# Patient Record
Sex: FEMALE | Race: WHITE | NOT HISPANIC OR LATINO | Employment: STUDENT | URBAN - METROPOLITAN AREA
[De-identification: names, ages, dates, MRNs, and addresses within clinical notes are randomized per-mention and may not be internally consistent; named-entity substitution may affect disease eponyms.]

---

## 2023-03-09 LAB
ALANINE AMINOTRANSFERASE (SGPT) (U/L) IN SER/PLAS: 9 U/L (ref 7–45)
ALBUMIN (G/DL) IN SER/PLAS: 4.9 G/DL (ref 3.4–5)
ALKALINE PHOSPHATASE (U/L) IN SER/PLAS: 43 U/L (ref 33–110)
ANION GAP IN SER/PLAS: 14 MMOL/L (ref 10–20)
ASPARTATE AMINOTRANSFERASE (SGOT) (U/L) IN SER/PLAS: 15 U/L (ref 9–39)
BASOPHILS (10*3/UL) IN BLOOD BY AUTOMATED COUNT: 0.05 X10E9/L (ref 0–0.1)
BASOPHILS/100 LEUKOCYTES IN BLOOD BY AUTOMATED COUNT: 0.7 % (ref 0–2)
BILIRUBIN TOTAL (MG/DL) IN SER/PLAS: 0.4 MG/DL (ref 0–1.2)
CALCIUM (MG/DL) IN SER/PLAS: 9.9 MG/DL (ref 8.6–10.6)
CARBON DIOXIDE, TOTAL (MMOL/L) IN SER/PLAS: 29 MMOL/L (ref 21–32)
CHLORIDE (MMOL/L) IN SER/PLAS: 104 MMOL/L (ref 98–107)
CREATININE (MG/DL) IN SER/PLAS: 0.7 MG/DL (ref 0.5–1.05)
EOSINOPHILS (10*3/UL) IN BLOOD BY AUTOMATED COUNT: 0.12 X10E9/L (ref 0–0.7)
EOSINOPHILS/100 LEUKOCYTES IN BLOOD BY AUTOMATED COUNT: 1.7 % (ref 0–6)
ERYTHROCYTE DISTRIBUTION WIDTH (RATIO) BY AUTOMATED COUNT: 15.8 % (ref 11.5–14.5)
ERYTHROCYTE MEAN CORPUSCULAR HEMOGLOBIN CONCENTRATION (G/DL) BY AUTOMATED: 31.2 G/DL (ref 32–36)
ERYTHROCYTE MEAN CORPUSCULAR VOLUME (FL) BY AUTOMATED COUNT: 80 FL (ref 80–100)
ERYTHROCYTES (10*6/UL) IN BLOOD BY AUTOMATED COUNT: 5.11 X10E12/L (ref 4–5.2)
GFR FEMALE: >90 ML/MIN/1.73M2
GLUCOSE (MG/DL) IN SER/PLAS: 86 MG/DL (ref 74–99)
HEMATOCRIT (%) IN BLOOD BY AUTOMATED COUNT: 40.7 % (ref 36–46)
HEMOGLOBIN (G/DL) IN BLOOD: 12.7 G/DL (ref 12–16)
IMMATURE GRANULOCYTES/100 LEUKOCYTES IN BLOOD BY AUTOMATED COUNT: 0.1 % (ref 0–0.9)
LEUKOCYTES (10*3/UL) IN BLOOD BY AUTOMATED COUNT: 7.1 X10E9/L (ref 4.4–11.3)
LYMPHOCYTES (10*3/UL) IN BLOOD BY AUTOMATED COUNT: 3.27 X10E9/L (ref 1.2–4.8)
LYMPHOCYTES/100 LEUKOCYTES IN BLOOD BY AUTOMATED COUNT: 46.1 % (ref 13–44)
MONOCYTES (10*3/UL) IN BLOOD BY AUTOMATED COUNT: 0.46 X10E9/L (ref 0.1–1)
MONOCYTES/100 LEUKOCYTES IN BLOOD BY AUTOMATED COUNT: 6.5 % (ref 2–10)
NEUTROPHILS (10*3/UL) IN BLOOD BY AUTOMATED COUNT: 3.18 X10E9/L (ref 1.2–7.7)
NEUTROPHILS/100 LEUKOCYTES IN BLOOD BY AUTOMATED COUNT: 44.9 % (ref 40–80)
NRBC (PER 100 WBCS) BY AUTOMATED COUNT: 0 /100 WBC (ref 0–0)
PLATELETS (10*3/UL) IN BLOOD AUTOMATED COUNT: 341 X10E9/L (ref 150–450)
POTASSIUM (MMOL/L) IN SER/PLAS: 4.6 MMOL/L (ref 3.5–5.3)
PROTEIN TOTAL: 7.4 G/DL (ref 6.4–8.2)
SODIUM (MMOL/L) IN SER/PLAS: 142 MMOL/L (ref 136–145)
THYROTROPIN (MIU/L) IN SER/PLAS BY DETECTION LIMIT <= 0.05 MIU/L: 5.34 MIU/L (ref 0.44–3.98)
THYROXINE (T4) FREE (NG/DL) IN SER/PLAS: 1.22 NG/DL (ref 0.78–1.48)
UREA NITROGEN (MG/DL) IN SER/PLAS: 10 MG/DL (ref 6–23)

## 2023-03-13 LAB — TSH RECEPTOR ANTIBODY: <0.8 IU/L

## 2023-03-15 LAB — THYROID STIMULATING IMMUNOGLOBULIN: <1 TSI INDEX

## 2023-06-03 LAB
THYROTROPIN (MIU/L) IN SER/PLAS BY DETECTION LIMIT <= 0.05 MIU/L: 1.68 MIU/L (ref 0.44–3.98)
THYROXINE (T4) FREE (NG/DL) IN SER/PLAS: 0.94 NG/DL (ref 0.61–1.12)
TRIIODOTHYRONINE (T3) (NG/DL) IN SER/PLAS: 124 NG/DL (ref 80–210)

## 2023-08-22 LAB
THYROTROPIN (MIU/L) IN SER/PLAS BY DETECTION LIMIT <= 0.05 MIU/L: 0.03 MIU/L (ref 0.44–3.98)
THYROXINE (T4) FREE (NG/DL) IN SER/PLAS: 1.64 NG/DL (ref 0.61–1.12)
TRIIODOTHYRONINE (T3) (NG/DL) IN SER/PLAS: 208 NG/DL (ref 80–210)

## 2023-08-26 LAB — TSH RECEPTOR ANTIBODY: <0.8 IU/L

## 2023-10-23 ENCOUNTER — LAB (OUTPATIENT)
Dept: LAB | Facility: LAB | Age: 18
End: 2023-10-23
Payer: COMMERCIAL

## 2023-10-23 DIAGNOSIS — E05.90 HYPERTHYROIDISM: ICD-10-CM

## 2023-10-23 LAB
T4 FREE SERPL-MCNC: 0.44 NG/DL (ref 0.61–1.12)
TSH SERPL-ACNC: 31.96 MIU/L (ref 0.44–3.98)

## 2023-10-23 PROCEDURE — 84439 ASSAY OF FREE THYROXINE: CPT

## 2023-10-23 PROCEDURE — 36415 COLL VENOUS BLD VENIPUNCTURE: CPT

## 2023-10-23 PROCEDURE — 84480 ASSAY TRIIODOTHYRONINE (T3): CPT

## 2023-10-23 PROCEDURE — 84443 ASSAY THYROID STIM HORMONE: CPT

## 2023-10-24 LAB — T3 SERPL-MCNC: 99 NG/DL (ref 80–210)

## 2023-10-24 RX ORDER — METHIMAZOLE 5 MG/1
5 TABLET ORAL DAILY
Qty: 90 TABLET | Refills: 3 | Status: CANCELLED | OUTPATIENT
Start: 2023-10-24 | End: 2024-10-23

## 2023-10-26 DIAGNOSIS — E05.00 GRAVES DISEASE: Primary | ICD-10-CM

## 2023-10-26 PROBLEM — E05.90 HYPERTHYROIDISM: Status: ACTIVE | Noted: 2023-10-26

## 2023-11-25 ENCOUNTER — LAB (OUTPATIENT)
Dept: LAB | Facility: LAB | Age: 18
End: 2023-11-25
Payer: COMMERCIAL

## 2023-11-25 DIAGNOSIS — E05.00 GRAVES DISEASE: ICD-10-CM

## 2023-11-25 LAB
T3 SERPL-MCNC: 124 NG/DL (ref 80–210)
T4 FREE SERPL-MCNC: 0.61 NG/DL (ref 0.61–1.12)
TSH SERPL-ACNC: 9.67 MIU/L (ref 0.44–3.98)

## 2023-11-25 PROCEDURE — 84443 ASSAY THYROID STIM HORMONE: CPT

## 2023-11-25 PROCEDURE — 84439 ASSAY OF FREE THYROXINE: CPT

## 2023-11-25 PROCEDURE — 36415 COLL VENOUS BLD VENIPUNCTURE: CPT

## 2023-11-25 PROCEDURE — 84480 ASSAY TRIIODOTHYRONINE (T3): CPT

## 2023-11-28 ENCOUNTER — PATIENT MESSAGE (OUTPATIENT)
Dept: PEDIATRIC ENDOCRINOLOGY | Facility: HOSPITAL | Age: 18
End: 2023-11-28
Payer: COMMERCIAL

## 2023-11-29 NOTE — RESULT ENCOUNTER NOTE
Decrease methimazole to 2.5 mg daily. Repeat TFTs in 4 weeks--orders in chart. Messaged recs to Radha.

## 2023-12-27 DIAGNOSIS — E05.00 GRAVES DISEASE: ICD-10-CM

## 2023-12-28 ENCOUNTER — TELEPHONE (OUTPATIENT)
Dept: OTOLARYNGOLOGY | Facility: HOSPITAL | Age: 18
End: 2023-12-28
Payer: COMMERCIAL

## 2023-12-28 DIAGNOSIS — B99.9 INFECTION: Primary | ICD-10-CM

## 2023-12-28 DIAGNOSIS — R60.9 SWELLING: ICD-10-CM

## 2023-12-28 RX ORDER — DOXYCYCLINE 100 MG/1
100 CAPSULE ORAL 2 TIMES DAILY
Qty: 20 CAPSULE | Refills: 0 | Status: SHIPPED | OUTPATIENT
Start: 2023-12-28 | End: 2023-12-31 | Stop reason: ALTCHOICE

## 2023-12-31 RX ORDER — AZITHROMYCIN 500 MG/1
500 TABLET, FILM COATED ORAL DAILY
Qty: 5 TABLET | Refills: 0 | Status: SHIPPED | OUTPATIENT
Start: 2023-12-31 | End: 2024-01-05

## 2023-12-31 RX ORDER — METHYLPREDNISOLONE 4 MG/1
TABLET ORAL
Qty: 21 TABLET | Refills: 0 | Status: SHIPPED | OUTPATIENT
Start: 2023-12-31

## 2024-02-28 ENCOUNTER — LAB (OUTPATIENT)
Dept: LAB | Facility: LAB | Age: 19
End: 2024-02-28
Payer: COMMERCIAL

## 2024-02-28 DIAGNOSIS — E05.00 GRAVES DISEASE: ICD-10-CM

## 2024-02-28 DIAGNOSIS — R74.01 ELEVATED ALT MEASUREMENT: ICD-10-CM

## 2024-02-28 LAB
BASOPHILS # BLD AUTO: 0.05 X10*3/UL (ref 0–0.1)
BASOPHILS NFR BLD AUTO: 0.9 %
EOSINOPHIL # BLD AUTO: 0.05 X10*3/UL (ref 0–0.7)
EOSINOPHIL NFR BLD AUTO: 0.9 %
ERYTHROCYTE [DISTWIDTH] IN BLOOD BY AUTOMATED COUNT: 15.9 % (ref 11.5–14.5)
HCT VFR BLD AUTO: 44.5 % (ref 36–46)
HGB BLD-MCNC: 13.7 G/DL (ref 12–16)
IMM GRANULOCYTES # BLD AUTO: 0.01 X10*3/UL (ref 0–0.7)
IMM GRANULOCYTES NFR BLD AUTO: 0.2 % (ref 0–0.9)
LYMPHOCYTES # BLD AUTO: 2.47 X10*3/UL (ref 1.2–4.8)
LYMPHOCYTES NFR BLD AUTO: 42 %
MCH RBC QN AUTO: 24.9 PG (ref 26–34)
MCHC RBC AUTO-ENTMCNC: 30.8 G/DL (ref 32–36)
MCV RBC AUTO: 81 FL (ref 80–100)
MONOCYTES # BLD AUTO: 0.43 X10*3/UL (ref 0.1–1)
MONOCYTES NFR BLD AUTO: 7.3 %
NEUTROPHILS # BLD AUTO: 2.87 X10*3/UL (ref 1.2–7.7)
NEUTROPHILS NFR BLD AUTO: 48.7 %
NRBC BLD-RTO: 0 /100 WBCS (ref 0–0)
PLATELET # BLD AUTO: 300 X10*3/UL (ref 150–450)
RBC # BLD AUTO: 5.5 X10*6/UL (ref 4–5.2)
T4 FREE SERPL-MCNC: 0.68 NG/DL (ref 0.61–1.12)
TSH SERPL-ACNC: 3.31 MIU/L (ref 0.44–3.98)
WBC # BLD AUTO: 5.9 X10*3/UL (ref 4.4–11.3)

## 2024-02-28 PROCEDURE — 84439 ASSAY OF FREE THYROXINE: CPT

## 2024-02-28 PROCEDURE — 85025 COMPLETE CBC W/AUTO DIFF WBC: CPT

## 2024-02-28 PROCEDURE — 84443 ASSAY THYROID STIM HORMONE: CPT

## 2024-02-28 PROCEDURE — 84480 ASSAY TRIIODOTHYRONINE (T3): CPT

## 2024-02-28 PROCEDURE — 80076 HEPATIC FUNCTION PANEL: CPT

## 2024-02-28 PROCEDURE — 36415 COLL VENOUS BLD VENIPUNCTURE: CPT

## 2024-02-29 ENCOUNTER — OFFICE VISIT (OUTPATIENT)
Dept: PEDIATRIC ENDOCRINOLOGY | Facility: CLINIC | Age: 19
End: 2024-02-29
Payer: COMMERCIAL

## 2024-02-29 VITALS
HEART RATE: 76 BPM | DIASTOLIC BLOOD PRESSURE: 76 MMHG | BODY MASS INDEX: 24.66 KG/M2 | WEIGHT: 134 LBS | HEIGHT: 62 IN | SYSTOLIC BLOOD PRESSURE: 115 MMHG

## 2024-02-29 DIAGNOSIS — E05.00 GRAVES DISEASE: ICD-10-CM

## 2024-02-29 DIAGNOSIS — R74.01 ELEVATED ALT MEASUREMENT: Primary | ICD-10-CM

## 2024-02-29 LAB
ALBUMIN SERPL BCP-MCNC: 4.6 G/DL (ref 3.4–5)
ALP SERPL-CCNC: 37 U/L (ref 33–110)
ALT SERPL W P-5'-P-CCNC: 13 U/L (ref 7–45)
AST SERPL W P-5'-P-CCNC: 17 U/L (ref 9–39)
BILIRUB DIRECT SERPL-MCNC: 0.1 MG/DL (ref 0–0.3)
BILIRUB SERPL-MCNC: 0.4 MG/DL (ref 0–1.2)
PROT SERPL-MCNC: 7.4 G/DL (ref 6.4–8.2)
T3 SERPL-MCNC: 103 NG/DL (ref 80–210)

## 2024-02-29 PROCEDURE — 99214 OFFICE O/P EST MOD 30 MIN: CPT | Performed by: PEDIATRICS

## 2024-02-29 ASSESSMENT — ENCOUNTER SYMPTOMS
PSYCHIATRIC NEGATIVE: 1
NEUROLOGICAL NEGATIVE: 1
EYES NEGATIVE: 1
UNEXPECTED WEIGHT CHANGE: 0
PALPITATIONS: 0
FATIGUE: 0
MUSCULOSKELETAL NEGATIVE: 1
DIARRHEA: 0
SHORTNESS OF BREATH: 0

## 2024-02-29 NOTE — PATIENT INSTRUCTIONS
Great seeing you today  Continue MMI 2.5 mg daily   Will consider definitive options such as radioactive ablation for the summer.

## 2024-02-29 NOTE — PROGRESS NOTES
"Subjective   Radha Alvarado is a 18 y.o. female who presents for follow-up for Graves' disease.  Last seen on 8/2023.    Endocrine history  Diagnosed with Graves' disease on 1/2021.  Has been on methimazole since then.  Had negative TSI and TRAb in March 2023 and was weaned off methimazole in May 2023.   Repeat TFT showed recurrence and was restarted on MMI 10 mgin 8/2023. Doses have been weaned, currently on 2.5 mg daily     Interval History  Currently on 2.5 mg daily.  Denies any missed doses.  Denies any symptoms of hyperthyroidism such as tremors, palpitations, sweating, heat intolerance, diarrhea or other systemic symptoms.  Would like to pursue definitive treatment, would like to do radioactive iodine ablation.    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    Had labs done on 2/28 with free T4 of 0.68, total T3 of 103 and TSH of 3.31.  Most recent CMP on 1/24 had ALT of 107 however was diagnosed with EBV at that time    Review of Systems   Constitutional:  Negative for fatigue and unexpected weight change.   HENT: Negative.     Eyes: Negative.    Respiratory:  Negative for shortness of breath.    Cardiovascular:  Negative for palpitations.   Gastrointestinal:  Negative for diarrhea.   Endocrine: Negative for heat intolerance.   Genitourinary: Negative.    Musculoskeletal: Negative.    Neurological: Negative.    Psychiatric/Behavioral: Negative.           Objective   /76   Pulse 76   Ht 1.577 m (5' 2.09\")   Wt 60.8 kg (134 lb)   BMI 24.44 kg/m²   Growth Velocity: Facility age limit for growth %braulio is 20 years.    Physical Exam  Constitutional:       Appearance: Normal appearance.   HENT:      Mouth/Throat:      Mouth: Mucous membranes are moist.   Eyes:      Pupils: Pupils are equal, round, and reactive to light.   Neck:      Comments: Soft boggy goiter, 4.5 cm   No nodules  Cardiovascular:      Rate and Rhythm: Normal rate and " regular rhythm.   Pulmonary:      Effort: Pulmonary effort is normal.      Breath sounds: Normal breath sounds.   Abdominal:      General: Abdomen is flat.   Musculoskeletal:         General: Normal range of motion.   Skin:     General: Skin is warm.      Capillary Refill: Capillary refill takes less than 2 seconds.   Neurological:      General: No focal deficit present.      Mental Status: She is alert.      Comments: No tremors         Assessment/Plan   Diagnoses and all orders for this visit:    Graves disease  This is a 18-year-old female with history of Graves' disease who is currently on low-dose methimazole.  She has failed remission in the past and therefore would require definitive treatment.  Radha prefers to have radioactive iodine, and is willing to do it over the summertime this year.  Information regarding radioactive iodine treatment and anticipatory guidance has been given today.  Will plan to get repeat ultrasound as it is been more than a year since her previous ultrasound in anticipation of her procedure.   Labs in May prior to her procedure include  -     Thyroid Stimulating Hormone; Future  -     TSH with reflex to Free T4 if abnormal; Future  -     Triiodothyronine, Total; Future  -     Thyroid Stimulating Immunoglobulin; Future  -     US thyroid; Future  -     Thyrotropin Receptor Antibody; Future    Once she undergoes radioactive iodine ablation, she will require TFTs in 4-6 weeks in order to start levothyroxine replacement.    Elevated ALT measurement  Will repeat LFTs, added onto labs done yesterday.  Likely secondary to her EBV infection however will repeat it in order to ensure this is not secondary to methimazole toxicity.  If ALT continues to be elevated, will need to consider different treatment sooner    Sherry Denny  PGY 6   Peds endo fellow

## 2024-03-22 DIAGNOSIS — E05.00 GRAVES DISEASE: ICD-10-CM

## 2024-05-01 ENCOUNTER — HOSPITAL ENCOUNTER (OUTPATIENT)
Dept: RADIOLOGY | Facility: HOSPITAL | Age: 19
Discharge: HOME | End: 2024-05-01
Payer: COMMERCIAL

## 2024-05-01 DIAGNOSIS — E05.00 GRAVES DISEASE: ICD-10-CM

## 2024-05-01 PROCEDURE — 76536 US EXAM OF HEAD AND NECK: CPT

## 2024-05-01 PROCEDURE — 76536 US EXAM OF HEAD AND NECK: CPT | Performed by: RADIOLOGY

## 2024-05-31 DIAGNOSIS — E05.00 GRAVES DISEASE: ICD-10-CM

## 2024-06-20 ENCOUNTER — LAB (OUTPATIENT)
Dept: LAB | Facility: LAB | Age: 19
End: 2024-06-20
Payer: COMMERCIAL

## 2024-06-20 DIAGNOSIS — E05.00 GRAVES DISEASE: ICD-10-CM

## 2024-06-20 LAB
B-HCG SERPL-ACNC: <2 MIU/ML
T3 SERPL-MCNC: 93 NG/DL (ref 80–210)
TSH SERPL-ACNC: 2.08 MIU/L (ref 0.44–3.98)

## 2024-06-20 PROCEDURE — 84702 CHORIONIC GONADOTROPIN TEST: CPT

## 2024-06-20 PROCEDURE — 84443 ASSAY THYROID STIM HORMONE: CPT

## 2024-06-20 PROCEDURE — 84445 ASSAY OF TSI GLOBULIN: CPT

## 2024-06-20 PROCEDURE — 83519 RIA NONANTIBODY: CPT

## 2024-06-20 PROCEDURE — 36415 COLL VENOUS BLD VENIPUNCTURE: CPT

## 2024-06-20 PROCEDURE — 84480 ASSAY TRIIODOTHYRONINE (T3): CPT

## 2024-06-22 LAB — TSH RECEP AB SER-ACNC: <1.1 IU/L

## 2024-06-24 ENCOUNTER — HOSPITAL ENCOUNTER (OUTPATIENT)
Dept: RADIOLOGY | Facility: HOSPITAL | Age: 19
Discharge: HOME | End: 2024-06-24
Payer: COMMERCIAL

## 2024-06-24 DIAGNOSIS — E05.00 GRAVES DISEASE: ICD-10-CM

## 2024-06-24 PROCEDURE — 3430000001 HC RX 343 DIAGNOSTIC RADIOPHARMACEUTICALS: Performed by: PEDIATRICS

## 2024-06-24 PROCEDURE — A9516 IODINE I-123 SOD IODIDE MIC: HCPCS | Performed by: PEDIATRICS

## 2024-06-24 PROCEDURE — 78014 THYROID IMAGING W/BLOOD FLOW: CPT

## 2024-06-24 RX ORDER — SODIUM IODIDE I 123 200 UCI/1
458 CAPSULE, GELATIN COATED ORAL
Status: COMPLETED | OUTPATIENT
Start: 2024-06-24 | End: 2024-06-24

## 2024-06-25 ENCOUNTER — HOSPITAL ENCOUNTER (OUTPATIENT)
Dept: RADIOLOGY | Facility: HOSPITAL | Age: 19
Discharge: HOME | End: 2024-06-25
Payer: COMMERCIAL

## 2024-06-25 ENCOUNTER — APPOINTMENT (OUTPATIENT)
Dept: RADIOLOGY | Facility: HOSPITAL | Age: 19
End: 2024-06-25
Payer: COMMERCIAL

## 2024-06-25 DIAGNOSIS — E05.00 GRAVES DISEASE: ICD-10-CM

## 2024-06-25 PROCEDURE — 79005 NUCLEAR RX ORAL ADMIN: CPT

## 2024-06-25 PROCEDURE — 78014 THYROID IMAGING W/BLOOD FLOW: CPT | Performed by: RADIOLOGY

## 2024-06-25 PROCEDURE — 3430000001 HC RX 343 DIAGNOSTIC RADIOPHARMACEUTICALS: Performed by: PEDIATRICS

## 2024-06-25 PROCEDURE — A9517 I131 IODIDE CAP, RX: HCPCS | Performed by: PEDIATRICS

## 2024-06-25 PROCEDURE — 79005 NUCLEAR RX ORAL ADMIN: CPT | Performed by: NUCLEAR MEDICINE

## 2024-06-25 RX ORDER — SODIUM IODIDE I 131 100 MCI/1
12.5 CAPSULE ORAL
Status: DISCONTINUED | OUTPATIENT
Start: 2024-06-25 | End: 2024-06-25

## 2024-06-25 RX ORDER — SODIUM IODIDE I 131 100 MCI/1
12.5 CAPSULE ORAL
Status: COMPLETED | OUTPATIENT
Start: 2024-06-25 | End: 2024-06-25

## 2024-06-27 ENCOUNTER — APPOINTMENT (OUTPATIENT)
Dept: RADIOLOGY | Facility: HOSPITAL | Age: 19
End: 2024-06-27
Payer: COMMERCIAL

## 2024-06-27 LAB — TSI SER-ACNC: <1 TSI INDEX

## 2024-07-18 ENCOUNTER — APPOINTMENT (OUTPATIENT)
Dept: PEDIATRIC ENDOCRINOLOGY | Facility: CLINIC | Age: 19
End: 2024-07-18
Payer: COMMERCIAL

## 2024-07-18 VITALS
HEART RATE: 93 BPM | WEIGHT: 133.3 LBS | DIASTOLIC BLOOD PRESSURE: 74 MMHG | BODY MASS INDEX: 24.53 KG/M2 | RESPIRATION RATE: 18 BRPM | SYSTOLIC BLOOD PRESSURE: 122 MMHG | HEIGHT: 62 IN

## 2024-07-18 DIAGNOSIS — E89.0 POSTABLATIVE HYPOTHYROIDISM: Primary | ICD-10-CM

## 2024-07-18 DIAGNOSIS — E05.00 GRAVES DISEASE: ICD-10-CM

## 2024-07-18 DIAGNOSIS — E05.90 HYPERTHYROIDISM: ICD-10-CM

## 2024-07-18 PROCEDURE — 3008F BODY MASS INDEX DOCD: CPT | Performed by: PEDIATRICS

## 2024-07-18 PROCEDURE — 99215 OFFICE O/P EST HI 40 MIN: CPT | Performed by: PEDIATRICS

## 2024-07-18 NOTE — PATIENT INSTRUCTIONS
Check labs in the next week.     Will let you know results and plan for treatment.     When you start levothyroxine, take daily on an empty stomach, 15-20 min before eating.   If you forget, take when you remember (it's ok if you've eaten).   Check labs 6 weeks after starting it.     We can also check labs if you have symptoms of hypothyroid (low thyroid): fatigue, dry skin, excess hair loss, constipation, feeling cold, weight gain, swelling, irregular periods  Or Hyperthyroid: excess energy, sweating, flushing, diarrhea, weight loss, feeling hot, inattention.     Come back Dec 19, 2024    Kary Cuba MD  [phone]

## 2024-07-18 NOTE — PROGRESS NOTES
"Subjective   Radha Alvarado is a 18 y.o. female who presents for follow-up for Graves' disease.  Last seen on 8/2023.    Endocrine history  Diagnosed with Graves' disease and started methimazole in 1/2021.  Weaned down to low dose over 2 years. Had negative TSI and TRAb in March 2023 and was weaned off methimazole in May 2023.   Repeat TFT showed recurrence and was restarted on MMI 10 mg in 8/2023. Dose again weaned to 2.5 mg daily.   At last visit in Feb 2024, decision made to procede with definitive therapy with radioiodide ablation.    Interval History  GODINEZ ablation performed  with I-131 on 6/25/24. No complications.   Pre-ablation TFTs were normal with negative Abs.   US and uptake scan showed mild thyroid enlargement, no nodules. 72% GODINEZ uptake (I-123) (normal 10-30%).    Denies any symptoms of hyperthyroidism such as tremors, palpitations, sweating, heat intolerance, diarrhea or other systemic symptoms.  Denies symptoms of hypothyroidism including fatigue, dry skin, excess hair loss, constipation, cold intolerance, weight gain, swelling.     Followed by Ophthalmology--no Grave's ophthalmopathy.    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  Trip to [place] with [place].    Objective   /74 (BP Location: Right arm, Patient Position: Sitting)   Pulse 93   Resp 18   Ht 1.573 m (5' 1.93\")   Wt 60.5 kg (133 lb 4.8 oz)   BMI 24.44 kg/m²   Growth Velocity: Facility age limit for growth %braulio is 20 years.    Physical Exam  Constitutional:       Appearance: Normal appearance.   HENT:      Mouth/Throat:      Mouth: Mucous membranes are moist.   Eyes:      Pupils: Pupils are equal, round, and reactive to light.   Neck:      Comments: Goiter seems smaller, soft ~4 cm bilat  No nodules  Cardiovascular:      Rate and Rhythm: Normal rate and regular rhythm.   Pulmonary:      Effort: Pulmonary effort is normal.      Breath sounds: Normal breath sounds.   Abdominal:      General: " Abdomen is flat.   Musculoskeletal:         General: Normal range of motion.   Skin:     General: Skin is warm.      Capillary Refill: Capillary refill takes less than 2 seconds.   Neurological:      General: No focal deficit present.      Mental Status: She is alert.      Comments: No tremors     LABS:  Lab on 06/20/2024   Component Date Value Ref Range Status    Thyroid Stimulating Hormone 06/20/2024 2.08  0.44 - 3.98 mIU/L Final    Triiodothyronine 06/20/2024 93  80 - 210 ng/dL Final    TSI 06/20/2024 <1.0  <=1.3 TSI index Final    TSH Receptor AB 06/20/2024 <1.10  <=1.75 IU/L Final    HCG, Beta-Quantitative 06/20/2024 <2  <5 mIU/mL Final     IMAGING  NM I-131 thyroid therapy  Narrative: Interpreted By:  Faustino Roche and Jones Robert   STUDY:  NM I-131 THYROID THERAPY;  6/25/2024 2:25 pm      INDICATION:  Signs/Symptoms:Graves, hyperthyroid.      COMPARISON:  Nuclear medicine thyroid uptake and scan from 04/25/2024.      ACCESSION NUMBER(S):  [id number]      ORDERING CLINICIAN:  JORGE ALBERTO ELIZABETH      TECHNIQUE:  DIVISION OF NUCLEAR MEDICINE  RADIOIODINE THERAPY, HYPERTHYROIDISM      Following discussion of the benefits and risks of radioiodine  therapy, including therapeutic alternatives and the risk of  hypothyroidism, the patient consented to radioiodine therapy and then  received an oral therapeutic dose of  12.5 milliCuries of Iodine-131  sodium iodide without any immediate complications.  Further followup  will be provided by the patient's referring physician, doctor Elizabeth.      FINDINGS:  Radioiodine therapy for Graves disease. No images were obtained. A  pregnancy test was negative.      Impression: Graves disease undergoing I-131 radioiodine therapy.      MACRO:  None          Signed by: Faustino Roche 6/25/2024 3:49 PM  Dictation workstation:   [id number]  NM thyroid uptake and scan  Narrative: Interpreted By:  Blayne Moser,   STUDY:  NM THYROID UPTAKE AND SCAN;  6/25/2024 2:20 pm       INDICATION:  Signs/Symptoms:Graves, hyperthyroid.      COMPARISON:  None.      ACCESSION NUMBER(S):  [id number]      ORDERING CLINICIAN:  JORGE ALBERTO ELIZABETH      TECHNIQUE:  DIVISION OF NUCLEAR MEDICINE  RADIOIODINE UPTAKE AND THYROID SCAN CONSULTATION, LIMITED      The patient consented to and received an oral diagnostic dose of  443  microcuries of Iodine-123 sodium iodide on  06/24/2024. Radioiodine  uptake measurements and thyroid images were then obtained  approximately 24 hours later on  .      FINDINGS:  The uptake of radioiodine in the anterior neck at about 24 hours is  72 percent (normal 10 to 30 percent).      Thyroid images demonstrate mild heterogeneity without definite  nodularity in the thyroid gland appears to be mildly enlarged..      Impression: Mild heterogeneity without definite nodularity in the thyroid gland  appears to be mildly enlarged and an elevated uptake of 72%. In the  proper clinical context, findings can be seen with Graves disease.      MACRO:  None      Signed by: Blayne Moser 6/25/2024 2:35 PM  Dictation workstation:   [id number]         Assessment/Plan   Graves disease  This is a 18-year-old female with history of Graves' disease now s/p GODINEZ ablation 4 weeks ago. She is doing well and is asymptomatic. I antiocipate she will start to have some biochemical hypothyroidism at some point in the next 2-4 weeks.    We discussed treatment with  Levothyroxine (timing, how to administer, monitoring). Reviewed symptoms of hypo and hyperthyroidism.     PLAN:   Check TFTs today. Also check celiac screen given h/o autoimmune disease.   Will plan treatment based on labs  Follow-up over winter break    Problem List Items Addressed This Visit             ICD-10-CM    Graves disease E05.00    Hyperthyroidism E05.90    Postablative hypothyroidism - Primary E89.0    Relevant Orders    Tissue Transglutaminase IgA (Completed)    Thyroid Stimulating Hormone (Completed)    Thyroxine, Free (Completed)

## 2024-07-19 ENCOUNTER — LAB (OUTPATIENT)
Dept: LAB | Facility: LAB | Age: 19
End: 2024-07-19
Payer: COMMERCIAL

## 2024-07-19 DIAGNOSIS — E89.0 POSTABLATIVE HYPOTHYROIDISM: ICD-10-CM

## 2024-07-19 LAB
T4 FREE SERPL-MCNC: 0.65 NG/DL (ref 0.61–1.12)
TSH SERPL-ACNC: 9.26 MIU/L (ref 0.44–3.98)
TTG IGA SER IA-ACNC: <1 U/ML

## 2024-07-19 PROCEDURE — 83516 IMMUNOASSAY NONANTIBODY: CPT

## 2024-07-19 PROCEDURE — 84443 ASSAY THYROID STIM HORMONE: CPT

## 2024-07-19 PROCEDURE — 84439 ASSAY OF FREE THYROXINE: CPT

## 2024-07-19 PROCEDURE — 36415 COLL VENOUS BLD VENIPUNCTURE: CPT

## 2024-07-24 DIAGNOSIS — E89.0 POSTABLATIVE HYPOTHYROIDISM: Primary | ICD-10-CM

## 2024-07-24 RX ORDER — LEVOTHYROXINE SODIUM 75 UG/1
75 TABLET ORAL DAILY
Qty: 30 TABLET | Refills: 11 | Status: SHIPPED | OUTPATIENT
Start: 2024-07-24 | End: 2025-07-24

## 2024-08-01 PROBLEM — E89.0 POSTABLATIVE HYPOTHYROIDISM: Status: ACTIVE | Noted: 2024-08-01

## 2024-08-20 ENCOUNTER — LAB (OUTPATIENT)
Dept: LAB | Facility: LAB | Age: 19
End: 2024-08-20
Payer: COMMERCIAL

## 2024-08-20 DIAGNOSIS — E89.0 POSTABLATIVE HYPOTHYROIDISM: ICD-10-CM

## 2024-08-20 LAB
T4 FREE SERPL-MCNC: 1.1 NG/DL (ref 0.61–1.12)
TSH SERPL-ACNC: 0.09 MIU/L (ref 0.44–3.98)

## 2024-08-20 PROCEDURE — 84443 ASSAY THYROID STIM HORMONE: CPT

## 2024-08-20 PROCEDURE — 36415 COLL VENOUS BLD VENIPUNCTURE: CPT

## 2024-08-20 PROCEDURE — 84439 ASSAY OF FREE THYROXINE: CPT

## 2024-08-21 DIAGNOSIS — E89.0 POSTABLATIVE HYPOTHYROIDISM: ICD-10-CM

## 2024-08-21 RX ORDER — LEVOTHYROXINE SODIUM 50 UG/1
50 TABLET ORAL DAILY
Qty: 30 TABLET | Refills: 11 | Status: SHIPPED | OUTPATIENT
Start: 2024-08-21 | End: 2025-08-21

## 2024-08-21 NOTE — RESULT ENCOUNTER NOTE
Suppressed TSH on  Levothyroxine 75 mcg. Will decrease Levothyroxine to 50 mcg daily and recheck TFTs in 4-8 weeks.

## 2024-10-05 ENCOUNTER — LAB (OUTPATIENT)
Dept: LAB | Facility: LAB | Age: 19
End: 2024-10-05
Payer: COMMERCIAL

## 2024-10-05 DIAGNOSIS — E89.0 POSTABLATIVE HYPOTHYROIDISM: ICD-10-CM

## 2024-10-05 LAB
T4 FREE SERPL-MCNC: 0.7 NG/DL (ref 0.61–1.12)
TSH SERPL-ACNC: 1.06 MIU/L (ref 0.44–3.98)

## 2024-10-05 PROCEDURE — 84439 ASSAY OF FREE THYROXINE: CPT

## 2024-10-05 PROCEDURE — 36415 COLL VENOUS BLD VENIPUNCTURE: CPT

## 2024-10-05 PROCEDURE — 84443 ASSAY THYROID STIM HORMONE: CPT

## 2025-01-06 DIAGNOSIS — E89.0 POSTABLATIVE HYPOTHYROIDISM: ICD-10-CM

## 2025-01-07 ENCOUNTER — LAB (OUTPATIENT)
Dept: LAB | Facility: LAB | Age: 20
End: 2025-01-07
Payer: COMMERCIAL

## 2025-01-07 DIAGNOSIS — E89.0 POSTABLATIVE HYPOTHYROIDISM: ICD-10-CM

## 2025-01-07 LAB
T4 FREE SERPL-MCNC: 0.83 NG/DL (ref 0.61–1.12)
TSH SERPL-ACNC: 42.16 MIU/L (ref 0.44–3.98)

## 2025-01-07 PROCEDURE — 84439 ASSAY OF FREE THYROXINE: CPT

## 2025-01-07 PROCEDURE — 84443 ASSAY THYROID STIM HORMONE: CPT

## 2025-01-09 ENCOUNTER — APPOINTMENT (OUTPATIENT)
Dept: PEDIATRIC ENDOCRINOLOGY | Facility: CLINIC | Age: 20
End: 2025-01-09
Payer: COMMERCIAL

## 2025-01-09 VITALS
WEIGHT: 137.35 LBS | HEIGHT: 62 IN | HEART RATE: 86 BPM | BODY MASS INDEX: 25.27 KG/M2 | DIASTOLIC BLOOD PRESSURE: 82 MMHG | SYSTOLIC BLOOD PRESSURE: 134 MMHG

## 2025-01-09 DIAGNOSIS — E89.0 POSTABLATIVE HYPOTHYROIDISM: Primary | ICD-10-CM

## 2025-01-09 PROCEDURE — 3008F BODY MASS INDEX DOCD: CPT | Performed by: PEDIATRICS

## 2025-01-09 PROCEDURE — 99214 OFFICE O/P EST MOD 30 MIN: CPT | Performed by: PEDIATRICS

## 2025-01-09 RX ORDER — LEVOTHYROXINE SODIUM 75 UG/1
75 TABLET ORAL DAILY
Qty: 30 TABLET | Refills: 11 | Status: SHIPPED | OUTPATIENT
Start: 2025-01-09 | End: 2026-01-09

## 2025-01-09 NOTE — PROGRESS NOTES
"Subjective   Radha Alvarado is a 19 y.o. female who presents for follow-up for Graves' disease.  Last seen on 8/2023.    Endocrine history  Diagnosed with Graves' disease and started methimazole in 1/2021.  Weaned down to low dose over 2 years. Had negative TSI and TRAb in March 2023 and was weaned off methimazole in May 2023.   Grave's recurred and restarted on MMI 10 mg in 8/2023. Dose again weaned to 2.5 mg daily.   GODINEZ ablation performed with I-131 on 6/25/24. No complications.   Pre-ablation TFTs were normal with negative Abs.   US and uptake scan showed mild thyroid enlargement, no nodules. 72% GODINEZ uptake (I-123) (normal 10-30%).  Levothyroxine started July 2024--initially 75 mcg-->decreased to 50 mcg in Aug 2024.  Normal TFTs in Oct 2024.    Interval History  Has been feeling tired. Some irregular menstrual spotting. Some dry skin, typical for winter.  Labs this week--TSH elevated to 42    Denies any symptoms of hyperthyroidism such as tremors, palpitations, sweating, heat intolerance, diarrhea or other systemic symptoms.  Denies other symptoms of hypothyroidism including dry skin, excess hair loss, constipation, cold intolerance, weight gain, swelling.     Followed by Ophthalmology--no Grave's ophthalmopathy.    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  Plans to study abroad this summer--maybe [place]    Objective   /82 (BP Location: Right arm, Patient Position: Sitting)   Pulse 86   Ht 1.571 m (5' 1.85\")   Wt 62.3 kg (137 lb 5.6 oz)   BMI 25.24 kg/m²   Growth Velocity: Facility age limit for growth %braulio is 20 years.    Physical Exam  Constitutional:       Appearance: Normal appearance.   HENT:      Mouth/Throat:      Mouth: Mucous membranes are moist.   Eyes:      Pupils: Pupils are equal, round, and reactive to light.   Neck:      Comments: Goiter seems smaller, soft ~4 cm bilat  No nodules  Cardiovascular:      Rate and Rhythm: Normal rate and regular rhythm.   Pulmonary: "      Effort: Pulmonary effort is normal.      Breath sounds: Normal breath sounds.   Abdominal:      General: Abdomen is flat.   Musculoskeletal:         General: Normal range of motion.   Skin:     General: Skin is warm.      Capillary Refill: Capillary refill takes less than 2 seconds.   Neurological:      General: No focal deficit present.      Mental Status: She is alert.      Comments: No tremors     LABS:  Lab on 01/07/2025   Component Date Value Ref Range Status    Thyroid Stimulating Hormone 01/07/2025 42.16 (H)  0.44 - 3.98 mIU/L Final    Thyroxine, Free 01/07/2025 0.83  0.61 - 1.12 ng/dL Final         Assessment/Plan   Graves disease  This is a 19 y.o.  female with history of Graves' disease now s/p GODINEZ ablation in June 2024. Started LT4 in July 2024. Now with elevated TSH indicating need for increased dose.   We reviewed treatment with  Levothyroxine (timing, how to administer, monitoring). Reviewed symptoms of hypo and hyperthyroidism.     PLAN:   --increase  Levothyroxine to 75 mcg daily  --recheck TFTs in 4-6 weeks  --follow up in summer 2025    Problem List Items Addressed This Visit             ICD-10-CM    Postablative hypothyroidism - Primary E89.0    Relevant Medications    levothyroxine (Synthroid, Levoxyl) 75 mcg tablet    Other Relevant Orders    Thyroid Stimulating Hormone    Thyroxine, Free

## 2025-01-09 NOTE — PATIENT INSTRUCTIONS
Increase your levothyroxine to 75 mcg daily    Recheck your labs in 4-6 weeks.     When you start levothyroxine, take daily on an empty stomach, 15-20 min before eating.   If you forget, take when you remember (it's ok if you've eaten).     We can also check labs if you have symptoms of hypothyroid (low thyroid): fatigue, dry skin, excess hair loss, constipation, feeling cold, weight gain, swelling, irregular periods  Or Hyperthyroid: excess energy, sweating, flushing, diarrhea, weight loss, feeling hot, inattention.     Come back summer 2025    Kary Cuba MD  [phone]

## 2025-02-20 DIAGNOSIS — E89.0 POSTABLATIVE HYPOTHYROIDISM: ICD-10-CM

## 2025-02-20 RX ORDER — LEVOTHYROXINE SODIUM 100 UG/1
100 TABLET ORAL DAILY
Qty: 30 TABLET | Refills: 11 | Status: SHIPPED | OUTPATIENT
Start: 2025-02-20 | End: 2026-02-20

## 2025-04-03 DIAGNOSIS — E89.0 POSTABLATIVE HYPOTHYROIDISM: ICD-10-CM

## 2025-05-07 ENCOUNTER — TELEPHONE (OUTPATIENT)
Dept: PEDIATRIC ENDOCRINOLOGY | Facility: HOSPITAL | Age: 20
End: 2025-05-07
Payer: COMMERCIAL

## 2025-05-07 DIAGNOSIS — E89.0 POSTABLATIVE HYPOTHYROIDISM: Primary | ICD-10-CM

## 2025-05-11 LAB
T4 FREE SERPL-MCNC: 1.3 NG/DL (ref 0.8–1.8)
TSH SERPL-ACNC: 6.06 MIU/L

## 2025-05-15 DIAGNOSIS — E89.0 POSTABLATIVE HYPOTHYROIDISM: ICD-10-CM

## 2025-05-20 RX ORDER — LEVOTHYROXINE SODIUM 112 UG/1
112 TABLET ORAL DAILY
Qty: 30 TABLET | Refills: 11 | Status: SHIPPED | OUTPATIENT
Start: 2025-05-20 | End: 2026-05-20

## 2025-07-31 ENCOUNTER — APPOINTMENT (OUTPATIENT)
Dept: PEDIATRIC ENDOCRINOLOGY | Facility: CLINIC | Age: 20
End: 2025-07-31
Payer: COMMERCIAL

## 2025-08-10 DIAGNOSIS — E89.0 POSTABLATIVE HYPOTHYROIDISM: ICD-10-CM

## 2025-08-12 LAB
T4 FREE SERPL-MCNC: 1.4 NG/DL (ref 0.8–1.8)
TSH SERPL-ACNC: 12.79 MIU/L

## 2025-08-14 ENCOUNTER — APPOINTMENT (OUTPATIENT)
Dept: PEDIATRIC ENDOCRINOLOGY | Facility: CLINIC | Age: 20
End: 2025-08-14
Payer: COMMERCIAL

## 2025-08-14 VITALS
WEIGHT: 143.74 LBS | HEART RATE: 85 BPM | SYSTOLIC BLOOD PRESSURE: 108 MMHG | BODY MASS INDEX: 26.45 KG/M2 | RESPIRATION RATE: 20 BRPM | HEIGHT: 62 IN | DIASTOLIC BLOOD PRESSURE: 74 MMHG

## 2025-08-14 DIAGNOSIS — E89.0 POSTABLATIVE HYPOTHYROIDISM: Primary | ICD-10-CM

## 2025-08-14 PROCEDURE — 3008F BODY MASS INDEX DOCD: CPT | Performed by: PEDIATRICS

## 2025-08-14 PROCEDURE — 99214 OFFICE O/P EST MOD 30 MIN: CPT | Performed by: PEDIATRICS

## 2025-08-14 RX ORDER — LEVOTHYROXINE SODIUM 125 UG/1
125 TABLET ORAL DAILY
Qty: 30 TABLET | Refills: 11 | Status: SHIPPED | OUTPATIENT
Start: 2025-08-14 | End: 2026-08-14

## 2025-08-14 ASSESSMENT — ENCOUNTER SYMPTOMS
DEPRESSION: 0
OCCASIONAL FEELINGS OF UNSTEADINESS: 0
LOSS OF SENSATION IN FEET: 0

## 2026-01-08 ENCOUNTER — APPOINTMENT (OUTPATIENT)
Dept: PEDIATRIC ENDOCRINOLOGY | Facility: CLINIC | Age: 21
End: 2026-01-08
Payer: COMMERCIAL